# Patient Record
Sex: FEMALE | Race: BLACK OR AFRICAN AMERICAN | NOT HISPANIC OR LATINO | URBAN - METROPOLITAN AREA
[De-identification: names, ages, dates, MRNs, and addresses within clinical notes are randomized per-mention and may not be internally consistent; named-entity substitution may affect disease eponyms.]

---

## 2020-06-15 ENCOUNTER — OFFICE VISIT (OUTPATIENT)
Dept: URBAN - METROPOLITAN AREA CLINIC 118 | Facility: CLINIC | Age: 20
End: 2020-06-15
Payer: OTHER GOVERNMENT

## 2020-06-15 DIAGNOSIS — K59.01 CONSTIPATION: ICD-10-CM

## 2020-06-15 DIAGNOSIS — K92.1 HEMATOCHEZIA: ICD-10-CM

## 2020-06-15 DIAGNOSIS — R10.84 ABDOMINAL CRAMPING, GENERALIZED: ICD-10-CM

## 2020-06-15 PROCEDURE — 99214 OFFICE O/P EST MOD 30 MIN: CPT | Performed by: INTERNAL MEDICINE

## 2020-06-15 PROCEDURE — G9903 PT SCRN TBCO ID AS NON USER: HCPCS | Performed by: INTERNAL MEDICINE

## 2020-06-15 PROCEDURE — G8417 CALC BMI ABV UP PARAM F/U: HCPCS | Performed by: INTERNAL MEDICINE

## 2020-06-15 PROCEDURE — G8427 DOCREV CUR MEDS BY ELIG CLIN: HCPCS | Performed by: INTERNAL MEDICINE

## 2020-06-15 PROCEDURE — 1036F TOBACCO NON-USER: CPT | Performed by: INTERNAL MEDICINE

## 2020-06-15 RX ORDER — DICYCLOMINE HYDROCHLORIDE 10 MG/1
1 TABLET CAPSULE ORAL
Qty: 30 | Refills: 1 | OUTPATIENT

## 2020-06-15 NOTE — HPI-TODAY'S VISIT:
The patient presents for f/u appt; she has a h/o chronic abd pain, intermittent hematochezia.  prior colonsocopy showed superificial colonic erosions (mild).  Temporarily on lialda with improvement in sx's, stopped taking last year due to side effects (loss of appetite, bloating).  she had done well for several months until ~1 week ago when she had generalized abd discomfort, worse in left side with associated constipation.  she tried otc enema with mild response.  otc miralax as well which did not help much.  Scant hematochezia, similar to past.  weight stable.  + loss of appetite.  [place].  denies nsaid's.

## 2020-06-17 LAB
A/G RATIO: 1.5
ALBUMIN: 4.5
ALKALINE PHOSPHATASE: 74
ALT (SGPT): 13
AST (SGOT): 20
BASO (ABSOLUTE): 0.1
BASOS: 1
BILIRUBIN, TOTAL: 0.3
BUN/CREATININE RATIO: 13
BUN: 11
C-REACTIVE PROTEIN, QUANT: 3
CALCIUM: 9.7
CARBON DIOXIDE, TOTAL: 24
CHLORIDE: 102
CREATININE: 0.82
EGFR IF AFRICN AM: 120
EGFR IF NONAFRICN AM: 104
EOS (ABSOLUTE): 0.1
EOS: 1
GLOBULIN, TOTAL: 3.1
GLUCOSE: 88
HEMATOCRIT: 41.2
HEMATOLOGY COMMENTS:: (no result)
HEMOGLOBIN: 12.8
IMMATURE CELLS: (no result)
IMMATURE GRANS (ABS): 0
IMMATURE GRANULOCYTES: 0
LYMPHS (ABSOLUTE): 1.9
LYMPHS: 38
MCH: 25.6
MCHC: 31.1
MCV: 82
MONOCYTES(ABSOLUTE): 0.4
MONOCYTES: 8
NEUTROPHILS (ABSOLUTE): 2.5
NEUTROPHILS: 52
NRBC: (no result)
PLATELETS: 286
POTASSIUM: 4.6
PROTEIN, TOTAL: 7.6
RBC: 5
RDW: 14.7
SODIUM: 141
WBC: 4.9

## 2020-08-17 ENCOUNTER — OFFICE VISIT (OUTPATIENT)
Dept: URBAN - METROPOLITAN AREA CLINIC 118 | Facility: CLINIC | Age: 20
End: 2020-08-17
Payer: OTHER GOVERNMENT

## 2020-08-17 ENCOUNTER — DASHBOARD ENCOUNTERS (OUTPATIENT)
Age: 20
End: 2020-08-17

## 2020-08-17 DIAGNOSIS — K92.1 HEMATOCHEZIA: ICD-10-CM

## 2020-08-17 DIAGNOSIS — K59.01 CONSTIPATION: ICD-10-CM

## 2020-08-17 PROBLEM — 14760008 CONSTIPATION: Status: ACTIVE | Noted: 2020-06-15

## 2020-08-17 PROCEDURE — 1036F TOBACCO NON-USER: CPT | Performed by: INTERNAL MEDICINE

## 2020-08-17 PROCEDURE — G8427 DOCREV CUR MEDS BY ELIG CLIN: HCPCS | Performed by: INTERNAL MEDICINE

## 2020-08-17 PROCEDURE — G8420 CALC BMI NORM PARAMETERS: HCPCS | Performed by: INTERNAL MEDICINE

## 2020-08-17 PROCEDURE — 99214 OFFICE O/P EST MOD 30 MIN: CPT | Performed by: INTERNAL MEDICINE

## 2020-08-17 RX ORDER — DICYCLOMINE HYDROCHLORIDE 10 MG/1
1 TABLET CAPSULE ORAL
Qty: 30 | Refills: 1 | Status: ACTIVE | COMMUNITY

## 2020-08-17 RX ORDER — LINACLOTIDE 72 UG/1
1 CAPSULE AT LEAST 30 MINUTES BEFORE THE FIRST MEAL OF THE DAY ON AN EMPTY STOMACH CAPSULE, GELATIN COATED ORAL ONCE A DAY
Qty: 30 | Refills: 6 | OUTPATIENT
Start: 2020-08-17 | End: 2021-03-15

## 2020-08-17 NOTE — HPI-TODAY'S VISIT:
The pt presents for f/u appt; reports linzess samples worked well for constipation.  denies further bleeding episodes.  mild, intermittent abd pain while [place] ([place] - stressful job per pt) which is mild and self-resolved.  no new gi complaints otherwise at this time.

## 2021-06-24 NOTE — PHYSICAL EXAM GASTROINTESTINAL
Abdomen , soft, nontender, nondistended , no guarding or rigidity , no masses palpable , normal bowel sounds , Liver and Spleen , no hepatomegaly present , no hepatosplenomegaly , liver nontender , spleen not palpable 24-Jun-2021 21:30:15

## 2022-08-25 ENCOUNTER — WEB ENCOUNTER (OUTPATIENT)
Dept: URBAN - METROPOLITAN AREA CLINIC 94 | Facility: CLINIC | Age: 22
End: 2022-08-25